# Patient Record
Sex: FEMALE | URBAN - METROPOLITAN AREA
[De-identification: names, ages, dates, MRNs, and addresses within clinical notes are randomized per-mention and may not be internally consistent; named-entity substitution may affect disease eponyms.]

---

## 2021-06-16 ENCOUNTER — NURSE TRIAGE (OUTPATIENT)
Dept: HEALTH INFORMATION MANAGEMENT | Facility: OTHER | Age: 71
End: 2021-06-16

## 2021-06-16 NOTE — TELEPHONE ENCOUNTER
"Pt having severe pain in RIGHT leg where she is unable to walk. Advised UC per protocol.     Reason for Disposition  • Thigh or calf pain in only one leg and present > 1 hour    Additional Information  • Negative: Looks like a broken bone or dislocated joint (e.g., crooked or deformed)  • Negative: Sounds like a life-threatening emergency to the triager  • Negative: Followed a hip injury  • Negative: Followed a knee injury  • Negative: Followed an ankle or foot injury  • Negative: Back pain radiating (shooting) into leg(s)  • Negative: Foot pain is the main symptom  • Negative: Ankle pain is the main symptom  • Negative: Knee pain is the main symptom  • Negative: Leg swelling is the main symptom  • Negative: Chest pain  • Negative: Difficulty breathing  • Negative: Entire foot is cool or blue in comparison to other side  • Negative: Unable to walk  • Negative: Fever and red area (or area very tender to touch)  • Negative: Fever and swollen joint    Answer Assessment - Initial Assessment Questions  1. ONSET: \"When did the pain start?\"       today  2. LOCATION: \"Where is the pain located?\"       RIGHT leg  3. PAIN: \"How bad is the pain?\"    (Scale 1-10; or mild, moderate, severe)    -  MILD (1-3): doesn't interfere with normal activities     -  MODERATE (4-7): interferes with normal activities (e.g., work or school) or awakens from sleep, limping     -  SEVERE (8-10): excruciating pain, unable to do any normal activities, unable to walk      severe  4. WORK OR EXERCISE: \"Has there been any recent work or exercise that involved this part of the body?\"       no  5. CAUSE: \"What do you think is causing the leg pain?\"      arthritis  6. OTHER SYMPTOMS: \"Do you have any other symptoms?\" (e.g., chest pain, back pain, breathing difficulty, swelling, rash, fever, numbness, weakness)      no  7. PREGNANCY: \"Is there any chance you are pregnant?\" \"When was your last menstrual period?\"      no    Protocols used: LEG " PAIN-A-OH